# Patient Record
Sex: FEMALE | Race: WHITE
[De-identification: names, ages, dates, MRNs, and addresses within clinical notes are randomized per-mention and may not be internally consistent; named-entity substitution may affect disease eponyms.]

---

## 2017-09-22 ENCOUNTER — HOSPITAL ENCOUNTER (EMERGENCY)
Dept: HOSPITAL 59 - ER | Age: 62
Discharge: HOME | End: 2017-09-22
Payer: COMMERCIAL

## 2017-09-22 DIAGNOSIS — E11.65: Primary | ICD-10-CM

## 2017-09-22 DIAGNOSIS — Z94.2: ICD-10-CM

## 2017-09-22 DIAGNOSIS — R11.0: ICD-10-CM

## 2017-09-22 DIAGNOSIS — R42: ICD-10-CM

## 2017-09-22 DIAGNOSIS — I10: ICD-10-CM

## 2017-09-22 LAB
ACETONE,SERUM: NEGATIVE
ALBUMIN SERPL-MCNC: 4.1 G/DL (ref 4–5)
ALBUMIN/GLOB SERPL: 1.6 {RATIO} (ref 1.1–1.8)
ALP SERPL-CCNC: 26 U/L (ref 35–104)
ALT SERPL-CCNC: 11 U/L (ref ?–33)
ANION GAP SERPL CALC-SCNC: 14 MMOL/L (ref 7–16)
APPEARANCE UR: CLEAR
AST SERPL-CCNC: 16 U/L (ref 10–35)
BASOPHILS NFR BLD: 0.2 % (ref 0–6)
BILIRUB SERPL-MCNC: 1 MG/DL (ref 0.2–1)
BILIRUB UR-MCNC: NEGATIVE MG/DL
BUN SERPL-MCNC: 31 MG/DL (ref 8–23)
CO2 SERPL-SCNC: 23 MMOL/L (ref 22–29)
COLOR UR: YELLOW
CREAT SERPL-MCNC: 1.9 MG/DL (ref 0.5–0.9)
EOSINOPHIL NFR BLD: 1 % (ref 0–6)
EPI CELLS #/AREA URNS HPF: (no result) /[HPF]
ERYTHROCYTE [DISTWIDTH] IN BLOOD BY AUTOMATED COUNT: 13.3 % (ref 11.5–14.5)
EST GLOMERULAR FILTRATION RATE: 28 ML/MIN
GLOBULIN SER-MCNC: 2.6 GM/DL (ref 1.4–4.8)
GLUCOSE SERPL-MCNC: 141 MG/DL (ref 74–109)
GLUCOSE UR STRIP-MCNC: NEGATIVE MG/DL
GRANULOCYTES NFR BLD: 72.2 % (ref 47–80)
HCT VFR BLD CALC: 28.9 % (ref 35–47)
HGB BLD-MCNC: 10.2 GM/DL (ref 11.6–16)
KETONES UR QL STRIP: NEGATIVE
LYMPHOCYTES NFR BLD AUTO: 17.6 % (ref 16–45)
MCH RBC QN AUTO: 34.1 PG (ref 27–33)
MCHC RBC AUTO-ENTMCNC: 35.3 G/DL (ref 32–36)
MCV RBC AUTO: 96.7 FL (ref 81–97)
MONOCYTES NFR BLD: 9 % (ref 0–9)
NITRITE UR QL STRIP: NEGATIVE
PLATELET # BLD: 185 K/UL (ref 130–400)
PMV BLD AUTO: 8.5 FL (ref 7.4–10.4)
PROT SERPL-MCNC: 6.7 G/DL (ref 6.6–8.7)
PROT UR QL STRIP: NEGATIVE
RBC # BLD AUTO: 2.99 M/UL (ref 3.8–5.4)
RBC # UR STRIP: (no result) /UL
RBC #/AREA URNS HPF: (no result) /[HPF]
URINE LEUKOCYTE ESTERASE: NEGATIVE
UROBILINOGEN UR STRIP-ACNC: 0.2 E.U./DL (ref 0.2–1)
WBC # BLD AUTO: 6 K/UL (ref 4.2–12.2)
WBC #/AREA URNS HPF: (no result) /[HPF]

## 2017-09-22 PROCEDURE — 84484 ASSAY OF TROPONIN QUANT: CPT

## 2017-09-22 PROCEDURE — 36416 COLLJ CAPILLARY BLOOD SPEC: CPT

## 2017-09-22 PROCEDURE — 82800 BLOOD PH: CPT

## 2017-09-22 PROCEDURE — 96374 THER/PROPH/DIAG INJ IV PUSH: CPT

## 2017-09-22 PROCEDURE — 93005 ELECTROCARDIOGRAM TRACING: CPT

## 2017-09-22 PROCEDURE — 96375 TX/PRO/DX INJ NEW DRUG ADDON: CPT

## 2017-09-22 PROCEDURE — 80053 COMPREHEN METABOLIC PANEL: CPT

## 2017-09-22 PROCEDURE — 93010 ELECTROCARDIOGRAM REPORT: CPT

## 2017-09-22 PROCEDURE — 99284 EMERGENCY DEPT VISIT MOD MDM: CPT

## 2017-09-22 PROCEDURE — 82009 KETONE BODYS QUAL: CPT

## 2017-09-22 PROCEDURE — 82948 REAGENT STRIP/BLOOD GLUCOSE: CPT

## 2017-09-22 PROCEDURE — 81001 URINALYSIS AUTO W/SCOPE: CPT

## 2017-09-22 PROCEDURE — 85025 COMPLETE CBC W/AUTO DIFF WBC: CPT

## 2017-09-22 NOTE — EMERGENCY DEPARTMENT RECORD
History of Present Illness





- General


Chief Complaint: Dizziness


Stated Complaint: DOESN'T FEEL WELL/HIGH SUGAR


Time Seen by Provider: 17 19:28


Source: Patient


Mode of Arrival: Ambulatory


Limitations: No limitations





- History of Present Illness


Initial Comments: 





63 yo female presents to ED with a CC of nausea and "elevated blood sugar" that 

she has been unable to drop at home with her home diabetes medications (januvia

, nagelitide).  Patient denies chest pain, difficulty breathing, or abdominal 

pain symptoms.  Patient denies fevers, chills, or recent illness.  Patient does 

report a history of bilateral lung transplant at [facility] in  for 

pulmonary fibrosis, reports that she is currently [id number] Prednisone and 

Tacrolimus for immunosupression.


MD Complaint: Other


Onset/Timin


-: Hour(s)


Timing: Sudden onset, Constant


Description: Lightheadedness


History of Same: Yes


History of Trauma: Yes


Severity: Mild


Improves With: Nothing


Worsens With: Nothing





- Dorr Coma Scale


Eye Response: (4) Open spontaneously


Motor Response: (6) Obeys commands


Verbal Response: (5) Oriented


Dorr Total: 15





- Related Data


 Home Medications











 Medication  Instructions  Recorded  Confirmed  Last Taken


 


Clonidine HCl 0.1 mg PO TID 17 Unknown


 


Folic Acid [Folic Acid] 1 mg PO DAILY 17 Unknown


 


Levothyroxine Sodium [Synthroid] 50 mcg PO DAILY 17 Unknown


 


Nateglinide [Starlix] 120 mg PO ASDIR 17 Unknown


 


Pentamidine Isethionate [Nebupent] 300 mg PO DAILY 17 Unknown


 


Prednisone [Prednisone 5Mg] 5 mg PO DAILY 17 Unknown


 


Sitagliptin Phosphate [Januvia] 25 mg PO DAILY 17 Unknown








 Previous Rx's











 Medication  Instructions  Recorded


 


Promethazine HCl [Phenergan] 25 mg PO Q6H PRN #15 tablet 17











 Allergies











Allergy/AdvReac Type Severity Reaction Status Date / Time


 


budesonide [From Symbicort] AdvReac  DIZZINESS Verified 17 19:30


 


formoterol [From Symbicort] AdvReac  DIZZINESS Verified 17 19:30


 


ibuprofen [From Motrin] AdvReac  NAUSEA Verified 17 19:30


 


morphine AdvReac  NAUSEA Verified 17 19:30


 


sulfamethoxazole AdvReac  NAUSEA Verified 17 19:30





[From Bactrim]     


 


trimethoprim [From Bactrim] AdvReac  NAUSEA Verified 17 19:30














Travel Screening





- Travel/Exposure Within Last 30 Days


Have you traveled within the last 30 days?: No





Review of Systems


Constitutional: Denies: Chills, Fever, Malaise, Night sweats


Eyes: Denies: Eye discharge, Eye pain


ENT: Denies: Congestion, Ear pain, Epistaxis


Respiratory: Denies: Cough, Dyspnea


Cardiovascular: Denies: Chest pain, Dyspnea on exertion


Endocrine: Denies: Fatigue, Heat or cold intolerance


Gastrointestinal: Reports: Nausea.  Denies: Constipation, Vomiting


Genitourinary: Denies: Incontinence, Retention


Musculoskeletal: Denies: Arthralgia, Back pain, Gout, Joint swelling


Skin: Denies: Bruising, Change in color


Neurological: Denies: Abnormal gait, Confusion, Headache, Tingling


Psychiatric: Denies: Anxiety


Hematological/Lymphatic: Denies: Anemia, Blood Clots





Past Medical History





- SOCIAL HISTORY


Smoking Status: Never smoker


Alcohol Use: None


Drug Use: None





- RESPIRATORY


Hx Respiratory Disorders: Yes


Comment:: Bilateral Lung Transplant (2009) r/t pulmonary fibrosis





- CARDIOVASCULAR


Hx Cardio Disorders: Yes


Hx Hypertension: Yes


Comment:: murmur





- NEURO


Hx Neuro Disorders: No





- GI


Hx GI Disorders: Yes


Hx Reflux: Yes





- 


Hx Genitourinary Disorders: Yes


Hx Renal Disease: Yes (CKD stage 4)





- ENDOCRINE


Hx Endocrine Disorders: Yes


Hx Diabetes: Yes (DM2)


Hx Thyroid Disease: Yes (Hypo)





- MUSCULOSKELETAL


Hx Musculoskeletal Disorders: No





- PSYCH


Hx Psych Problems: No





- HEMATOLOGY/ONCOLOGY


Hx Hematology/Oncology Disorders: Yes


Hx Cancer: Yes (Precancerous skin (surgically removed))


Hx Chemotherapy: No


Hx Radiation Therapy: No





Family Medical History


Any Significant Family History?: Yes


Hx Diabetes: Father


Hx HTN: Father


Hx Resp Disorders: Mother, Brother/Sister


*Resp Comment: Pulmonary Fibrosis





Physical Exam





- General


General Appearance: Alert, Oriented x3, Cooperative, Moderate distress


Limitations: No limitations





- Head


Head exam: Atraumatic, Normocephalic, Normal inspection


Head exam detail: negative: Abrasion, Contusion, Khan's sign, General 

tenderness, Hematoma, Laceration





- Eye


Eye exam: Normal appearance.  negative: Conjunctival injection, Periorbital 

swelling, Periorbital tenderness, Scleral icterus





- ENT


Ear exam: negative: Auricular hematoma, Auricular trauma


Nasal Exam: negative: Active bleeding, Discharge, Dried blood


Mouth exam: negative: Drooling, Laceration, Tongue elevation





- Neck


Neck exam: Normal inspection.  negative: Meningismus, Tenderness





- Respiratory


Respiratory exam: Normal lung sounds bilaterally.  negative: Respiratory 

distress, Rhonchi, Stridor, Wheezes





- Cardiovascular


Cardiovascular Exam: Regular rate, Normal rhythm, Normal heart sounds





- GI/Abdominal


GI/Abdominal exam: Soft.  negative: Rebound, Rigid, Tenderness





- Rectal


Rectal exam: Deferred





- 


 exam: Deferred





- Extremities


Extremities exam: Normal inspection.  negative: Calf tenderness, Pedal edema, 

Tenderness





- Back


Back exam: Denies: CVA tenderness (R), CVA tenderness (L)





- Neurological


Neurological exam: Alert, Normal gait, Oriented X3





- Psychiatric


Psychiatric exam: Normal affect, Normal mood





- Skin


Skin exam: Normal color.  negative: Abrasion


Type of lesion: negative: abrasion





Course





 Vital Signs











  17





  19:29


 


Temperature 98.4 F


 


Pulse Rate 76


 


Respiratory 18





Rate 


 


Blood Pressure 187/81


 


Pulse Ox 98














- Reevaluation(s)


Reevaluation #1: 





17 20:15


EKG: NSR 70


Normal axis


Normal intervals


Artifact V4-V5, no acute ST-T wave changes


Reevaluation #2: 





17 20:50


Labs reviewed, Hgb 10.2, BUN 31/Creatinine 1.9 (at baseline).  Troponin is 

negative, labs are otherwise grossly unremarkable for an acute process.


Reevaluation #3: 





17 20:54


Patient reassessed and reports that she is feeling much better.  EKG and 

Troponin fail to demonstrate an acute cardiac etiology for her symptoms, and 

her symptoms are greatly improved with Phenergan.  Patient was updated on all 

results and appears stable for discharge at this time with phenergan at home 

for her symptoms.





Medical Decision Making





- Lab Data


Result diagrams: 


 17 20:01





 17 20:01





 Lab Results











  17 Range/Units





  19:19 


 


POC Glucose  147 H  ()  mg/dL














Disposition


Disposition: Discharge


Clinical Impression: 


 Nausea





Hyperglycemia due to type 2 diabetes mellitus


Qualifiers:


 Diabetes mellitus long term insulin use: unspecified long term insulin use 

status Qualified Code(s): E11.65 - Type 2 diabetes mellitus with hyperglycemia





Disposition: Home, Self-Care


Condition: (2) Stable


Instructions:  Acute Nausea and Vomiting (ED)


Additional Instructions: 


Return to ED if your symptoms worsen or if you have any concerns.


Phenergan as directed.


Follow-up with your family doctor in 2-3 days as directed.


Prescriptions: 


Promethazine HCl [Phenergan] 25 mg PO Q6H PRN #15 tablet


 PRN Reason: Nausea/Vomiting


Forms:  Patient Portal Access


Time of Disposition: 20:57





Quality





- Quality Measures


Quality Measures: N/A





- Blood Pressure Screening


Does Patient Have Any of the Following: Active Dx of HTN


Blood Pressure Classification: Pre-Hypertensive BP Reading


Systolic Measurement: 175


Diastolic Measurement: 87


Screening for High Blood Pressure: Patient Exclusion, Hx of HTN []

## 2018-02-20 ENCOUNTER — HOSPITAL ENCOUNTER (EMERGENCY)
Dept: HOSPITAL 59 - ER | Age: 63
Discharge: LEFT BEFORE DISCHARGE | End: 2018-02-20
Payer: COMMERCIAL

## 2018-02-20 DIAGNOSIS — Z53.20: Primary | ICD-10-CM

## 2019-10-04 ENCOUNTER — HOSPITAL ENCOUNTER (EMERGENCY)
Dept: HOSPITAL 59 - ER | Age: 64
Discharge: ANOTHER HOSPITAL | End: 2019-10-04
Payer: COMMERCIAL

## 2019-10-04 DIAGNOSIS — Z94.2: ICD-10-CM

## 2019-10-04 DIAGNOSIS — Z79.4: ICD-10-CM

## 2019-10-04 DIAGNOSIS — N18.4: ICD-10-CM

## 2019-10-04 DIAGNOSIS — R79.89: Primary | ICD-10-CM

## 2019-10-04 DIAGNOSIS — E11.22: ICD-10-CM

## 2019-10-04 DIAGNOSIS — I12.9: ICD-10-CM

## 2019-10-04 DIAGNOSIS — R07.2: ICD-10-CM

## 2019-10-04 LAB
ABSOLUTE NEUTROPHIL COUNT: 3.73
ANION GAP SERPL CALC-SCNC: 14 MMOL/L (ref 7–16)
BASOPHILS NFR BLD: 0.3 % (ref 0–6)
BUN SERPL-MCNC: 48 MG/DL (ref 8–23)
CO2 SERPL-SCNC: 27 MMOL/L (ref 22–29)
CREAT SERPL-MCNC: 3 MG/DL (ref 0.5–0.9)
EOSINOPHIL NFR BLD: 1.8 % (ref 0–6)
ERYTHROCYTE [DISTWIDTH] IN BLOOD BY AUTOMATED COUNT: 14.7 % (ref 11.5–14.5)
EST GLOMERULAR FILTRATION RATE: 17 ML/MIN
GLUCOSE SERPL-MCNC: 134 MG/DL (ref 74–109)
GRANULOCYTES NFR BLD: 61.6 % (ref 47–80)
HCT VFR BLD CALC: 27.3 % (ref 35–47)
HGB BLD-MCNC: 8.8 GM/DL (ref 11.6–16)
LYMPHOCYTES NFR BLD AUTO: 25.1 % (ref 16–45)
MCH RBC QN AUTO: 34.2 PG (ref 27–33)
MCHC RBC AUTO-ENTMCNC: 32.2 G/DL (ref 32–36)
MCV RBC AUTO: 106.2 FL (ref 81–97)
MONOCYTES NFR BLD: 11.2 % (ref 0–9)
PLATELET # BLD: 198 K/UL (ref 130–400)
PMV BLD AUTO: 10.2 FL (ref 7.4–10.4)
RBC # BLD AUTO: 2.57 M/UL (ref 3.8–5.4)
WBC # BLD AUTO: 6.1 K/UL (ref 4.2–12.2)

## 2019-10-04 PROCEDURE — 99285 EMERGENCY DEPT VISIT HI MDM: CPT

## 2019-10-04 PROCEDURE — 84484 ASSAY OF TROPONIN QUANT: CPT

## 2019-10-04 PROCEDURE — 36416 COLLJ CAPILLARY BLOOD SPEC: CPT

## 2019-10-04 PROCEDURE — 80048 BASIC METABOLIC PNL TOTAL CA: CPT

## 2019-10-04 PROCEDURE — 85730 THROMBOPLASTIN TIME PARTIAL: CPT

## 2019-10-04 PROCEDURE — 96375 TX/PRO/DX INJ NEW DRUG ADDON: CPT

## 2019-10-04 PROCEDURE — 93010 ELECTROCARDIOGRAM REPORT: CPT

## 2019-10-04 PROCEDURE — 93005 ELECTROCARDIOGRAM TRACING: CPT

## 2019-10-04 PROCEDURE — 96374 THER/PROPH/DIAG INJ IV PUSH: CPT

## 2019-10-04 PROCEDURE — 85025 COMPLETE CBC W/AUTO DIFF WBC: CPT

## 2019-10-04 PROCEDURE — 82948 REAGENT STRIP/BLOOD GLUCOSE: CPT

## 2019-10-04 NOTE — EMERGENCY DEPARTMENT RECORD
History of Present Illness





- General


Chief Complaint: Hypertension


Stated Complaint: HIGH BLOOD PRESSURE


Time Seen by Provider: 10/04/19 11:53


Source: Patient, RN notes reviewed


Mode of Arrival: Ambulatory





- History of Present Illness


Initial Comments: 


hypertension 212/85 and she is taking clonidine 0.3 TID and she has stage 4 

kidney disease and a lung transplant in 2009 and her primary is Dr Rojas at 

[facility] and the nurse in the office said to go to the ED.  she is using lidocaine 

and tylenol for chest wall pain diagnosised at the [facility]. PMH DM 

,hypothyroidism, Patient said she was at the [facility] 2 weeks ago and 

she was told she was anemic and hg 8 and she was placed on iron





Timing: Awoke with symptoms


History of Same: Yes


History of Trauma: No


Severity: Moderate


Improves With: Nothing


Worsens With: Nothing


Associated Symptoms: Shortness of breath





- Shaan Coma Scale


Eye Response: (4) Open spontaneously


Motor Response: (6) Obeys commands


Verbal Response: (5) Oriented


Shaan Total: 15





- Related Data


                                Home Medications











 Medication  Instructions  Recorded  Confirmed  Last Taken


 


Acyclovir 200 mg PO BID 10/04/19 10/04/19 Unknown


 


Azithromycin 250 mg PO ASDIR 10/04/19 10/04/19 Unknown


 


Calcium Carbonate/Vitamin D3 1 each PO DAILY 10/04/19 10/04/19 Unknown





[Calcium 600 + Vit D Tablet]    


 


Insulin Aspart [Novolog] 7 unit SQ WMEALS 10/04/19 10/04/19 10/04/19


 


Insulin Glargine,Hum.rec.anlog 24 unit SQ QAM 10/04/19 10/04/19 10/04/19





[Lantus Solostar]    


 


Itraconazole [Sporanox] 200 mg PO DAILY 10/04/19 10/04/19 Unknown


 


Linagliptin [Tradjenta] 5 mg PO DAILY 10/04/19 10/04/19 Unknown


 


Magnesium Oxide [Magnesium] 250 mg PO DAILY 10/04/19 10/04/19 Unknown


 


Pentamidine Isethionate [Nebupent] 300 mg IH MONTHLY 10/04/19 10/04/19 Unknown


 


Tacrolimus [Prograf] 0.5 mg PO QAM 10/04/19 10/04/19 Unknown











                                    Allergies











Allergy/AdvReac Type Severity Reaction Status Date / Time


 


budesonide [From Symbicort] AdvReac  DIZZINESS Verified 10/04/19 11:54


 


formoterol [From Symbicort] AdvReac  DIZZINESS Verified 10/04/19 11:54


 


ibuprofen [From Motrin] AdvReac  NAUSEA Verified 10/04/19 11:54


 


morphine AdvReac  NAUSEA Verified 10/04/19 11:54


 


sulfamethoxazole AdvReac  NAUSEA Verified 10/04/19 11:54





[From Bactrim]     


 


trimethoprim [From Bactrim] AdvReac  NAUSEA Verified 10/04/19 11:54














Travel Screening





- Travel/Exposure Within Last 30 Days


Have you traveled within the last 30 days?: No





Review of Systems


Reviewed: No additional complaints except as noted below


Constitutional: Reports: As per HPI.  Denies: Chills, Fever, Malaise, Night 

sweats, Weakness, Weight change


Eyes: Reports: As per HPI.  Denies: Eye discharge, Eye pain, Photophobia, Vision

change


ENT: Reports: As per HPI.  Denies: Congestion, Dental pain, Ear pain, Epistaxis,

Hearing loss, Throat pain


Respiratory: Reports: As per HPI.  Denies: Cough, Dyspnea, Hemoptysis, Stridor, 

Wheezes


Cardiovascular: Reports: As per HPI, Chest pain.  Denies: Arrhythmia, Dyspnea on

exertion, Edema, Murmurs, Orthopnea, Palpitations, Paroxysmal nocturnal dyspnea,

Rheumatic Fever, Syncope


Endocrine: Reports: As per HPI.  Denies: Fatigue, Heat or cold intolerance, 

Polydipsia, Polyuria


Gastrointestinal: Reports: As per HPI.  Denies: Abdominal pain, Constipation, 

Diarrhea, Hematemesis, Hematochezia, Melena, Nausea, Vomiting


Genitourinary: Reports: As per HPI.  Denies: Abnormal menses, Discharge, 

Dyspareunia, Dysuria, Frequency, Hematuria, Incontinence, Retention, Urgency


Musculoskeletal: Reports: As per HPI.  Denies: Arthralgia, Back pain, Gout, 

Joint swelling, Myalgia, Neck pain


Skin: Reports: As per HPI.  Denies: Bruising, Change in color, Change in ha

ir/nails, Lesions, Pruritus, Rash


Neurological: Reports: As per HPI.  Denies: Abnormal gait, Confusion, Headache, 

Numbness, Paresthesias, Seizure, Tingling, Tremors, Vertigo, Weakness


Psychiatric: Reports: As per HPI.  Denies: Anxiety, Auditory hallucinations, 

Depression, Homicidal thoughts, Suicidal thoughts, Visual hallucinations


Hematological/Lymphatic: Reports: As per HPI.  Denies: Anemia, Blood Clots, Easy

bleeding, Easy bruising, Swollen glands





Past Medical History





- SOCIAL HISTORY


Smoking Status: Never smoker


Alcohol Use: None


Drug Use: None





- RESPIRATORY


Hx Respiratory Disorders: Yes


Comment:: Bilateral Lung Transplant (11/2009) r/t pulmonary fibrosis





- CARDIOVASCULAR


Hx Cardio Disorders: Yes


Hx Hypertension: Yes


Comment:: murmur





- NEURO


Hx Neuro Disorders: No





- GI


Hx GI Disorders: Yes


Hx Reflux: Yes





- 


Hx Genitourinary Disorders: Yes


Hx Renal Disease: Yes (CKD stage 4)





- ENDOCRINE


Hx Endocrine Disorders: Yes


Hx Diabetes: Yes (DM2)


Hx Thyroid Disease: Yes (Hypo)





- MUSCULOSKELETAL


Hx Musculoskeletal Disorders: No





- PSYCH


Hx Psych Problems: No





- HEMATOLOGY/ONCOLOGY


Hx Hematology/Oncology Disorders: Yes


Hx Cancer: Yes (Precancerous skin (surgically removed))


Hx Chemotherapy: No


Hx Radiation Therapy: No





Family Medical History


Any Significant Family History?: Yes


Hx Diabetes: Father


Hx HTN: Father


Hx Resp Disorders: Mother, Brother/Sister


*Resp Comment: Pulmonary Fibrosis





Physical Exam





- General


General Appearance: Alert, Oriented x3, Cooperative, No acute distress





- Head


Head exam: Normal inspection





- Eye


Eye exam: Normal appearance, PERRL


Pupils: Normal accommodation





- ENT


ENT exam: Normal exam, Mucous membranes moist, Normal external ear exam, Normal 

orophraynx, TM's normal bilaterally


Ear exam: Normal external inspection.  negative: External canal tenderness


Nasal Exam: Normal inspection.  negative: Discharge, Sinus tenderness


Mouth exam: Normal external inspection, Tongue normal


Teeth exam: Normal inspection.  negative: Dental caries


Throat exam: Normal inspection.  negative: Tonsillar erythema, Tonsillar exudate





- Neck


Neck exam: Normal inspection, Full ROM.  negative: Tenderness





- Respiratory


Respiratory exam: Normal lung sounds bilaterally.  negative: Respiratory 

distress





- Cardiovascular


Cardiovascular Exam: Regular rate, Normal rhythm, Normal heart sounds





- GI/Abdominal


GI/Abdominal exam: Soft, Normal bowel sounds.  negative: Tenderness





- Rectal


Rectal exam: Deferred





- 


 exam: Deferred





- Extremities


Extremities exam: Normal inspection, Full ROM, Normal capillary refill.  

negative: Tenderness





- Back


Back exam: Reports: Normal inspection, Full ROM.  Denies: Muscle spasm, Rash 

noted, Tenderness





- Neurological


Neurological exam: Alert, Normal gait, Oriented X3, Reflexes normal





- Psychiatric


Psychiatric exam: Normal affect, Normal mood





- Skin


Skin exam: Dry, Intact, Normal color, Warm





Course





                                   Vital Signs











  10/04/19





  11:50


 


Temperature 97.7 F


 


Pulse Rate 95 H


 


Respiratory 20





Rate 


 


Blood Pressure 227/86


 


Pulse Ox 100














- Reevaluation(s)


Reevaluation #1: 


while in the ED after the hydralazine shot she developed redness in her leg and 

they felt hot and she had chest pressure and palpations and EKG done with labs 

and her trop is elevated. EKG shows nonspecific changes and her cardiologist is 

at [facility] and normally when admitted she goes into [facility]


10/04/19 15:54





Reevaluation #2: 


bp 181/77


10/04/19 16:11





Reevaluation #3: 


discussed case with Dr. Avila and Dr Dumont and will admit at [facility] when bed 

is available.  Dr. Avila is accepting


10/04/19 16:24








Medical Decision Making





- Data Complexity


MDM Data: Labs Ordered and/or Reviewed (trop elevated 0.035 indeterminate,creat 

3.0, hg 8.8), EKG Ordered and/or Reviewed (nsr, nonspecific st changes )





- Lab Data


Result diagrams: 


                                 10/04/19 11:55





                                 10/04/19 11:55





Disposition


Clinical Impression: 


 Elevated troponin





Chest pain


Qualifiers:


 Chest pain type: precordial pain Qualified Code(s): R07.2 - Precordial pain





Anemia


Qualifiers:


 Anemia type: unspecified type Qualified Code(s): D64.9 - Anemia, unspecified





Renal failure


Qualifiers:


 Renal failure chronicity: chronic Chronic kidney disease stage: stage 4 

(severe) Qualified Code(s): N18.4 - Chronic kidney disease, stage 4 (severe)





Disposition: Acute Care Hospital Transfer


Condition: (2) Stable


Forms:  Patient Portal Access


Time of Disposition: 16:26





Quality





- Quality Measures


Quality Measures: N/A





- Blood Pressure Screening


Does Patient Have Any of the Following: No, Active Dx of HTN


Blood Pressure Classification: Pre-Hypertensive BP Reading


Systolic Measurement: 227


Diastolic Measurement: 86


Screening for High Blood Pressure: Patient Exclusion, Hx of HTN []

## 2020-01-17 ENCOUNTER — HOSPITAL ENCOUNTER (OUTPATIENT)
Dept: HOSPITAL 59 - HOP | Age: 65
Discharge: HOME | End: 2020-01-17
Attending: INTERNAL MEDICINE
Payer: COMMERCIAL

## 2020-01-17 DIAGNOSIS — Z94.2: ICD-10-CM

## 2020-01-17 DIAGNOSIS — K57.30: ICD-10-CM

## 2020-01-17 DIAGNOSIS — Z79.4: ICD-10-CM

## 2020-01-17 DIAGNOSIS — Z12.11: Primary | ICD-10-CM

## 2020-01-17 DIAGNOSIS — E03.9: ICD-10-CM

## 2020-01-17 DIAGNOSIS — E11.9: ICD-10-CM

## 2020-01-17 DIAGNOSIS — E78.00: ICD-10-CM

## 2020-01-17 DIAGNOSIS — I10: ICD-10-CM

## 2020-01-17 PROCEDURE — 00812 ANES LWR INTST SCR COLSC: CPT

## 2020-01-21 NOTE — OPERATIVE NOTE
SURGEON: Marko Montoya MD  



OPERATION: COLONOSCOPY. 



INDICATIONS: This is a 64-year-old female with history of lung transplant on 
tacrolimus with high risk for colorectal cancer who presented for screening 
colonoscopy. 



POSTOPERATIVE DIAGNOSES:

1. Left-sided colonic diverticulosis. 

2. Otherwise normal colon although the bowel preparation was suboptimal. 



ANESTHESIA: Sedation is per Anesthesia. Pulse oximetry was monitored throughout 
the procedure to maintain O2 saturation of 90% or greater. Supplemental oxygen 
was administered via nasal cannula. Cardiac and vital signs were monitored 
throughout the duration of the procedure, and they were stable. 



The procedure of colonoscopy and risks and alternatives of the procedure, 
including the risk of bleeding and perforation, among others, were explained to 
the patient who voiced understanding and agreed to have the procedure done. 
Physical examination was performed, and the patient was found stable for 
sedation. 



PROCEDURE: The patient was placed in the left lateral position. Sedation was 
initiated. A digital rectal exam was performed and showed some mild external 
hemorrhoids with no palpable rectal masses. An Olympus PCF-180AL colonoscope was
then inserted into the rectum under direct visualization. It was advanced to the
cecum without difficulty. The ileocecal valve and appendiceal orifice were 
identified and photographed. The colonic mucosa was carefully examined upon 
introduction of the colonoscope. There were scattered diverticula noted in the 
sigmoid and descending colon. There were no other lesions noted. The bowel 
preparation was suboptimal, especially in the right colon. The colonoscope was 
then withdrawn while carefully examining the colonic mucosal surfaces. No other 
lesions were noted. In the rectum, retroflexion was performed and grade 1 
internal hemorrhoids were noted. The colonoscope was then withdrawn and the 
procedure was terminated. The patient tolerated the procedure well without any 
immediate complications. The patient remained with stable vital signs and was 
transferred to the recovery room.



RECOMMENDATIONS: 

1. The patient should be on a high-fiber diet. 

2. The patient is to have a repeat colonoscopy for screening 2 years given her 
high-risk state. 



Thank you for allowing me to participate in the care of your patient. 

CHEMO